# Patient Record
Sex: FEMALE | Race: WHITE | NOT HISPANIC OR LATINO | URBAN - METROPOLITAN AREA
[De-identification: names, ages, dates, MRNs, and addresses within clinical notes are randomized per-mention and may not be internally consistent; named-entity substitution may affect disease eponyms.]

---

## 2022-08-04 ENCOUNTER — OFFICE (OUTPATIENT)
Dept: URBAN - METROPOLITAN AREA CLINIC 29 | Facility: CLINIC | Age: 67
Setting detail: OPHTHALMOLOGY
End: 2022-08-04
Payer: MEDICARE

## 2022-08-04 DIAGNOSIS — H01.005: ICD-10-CM

## 2022-08-04 DIAGNOSIS — H01.002: ICD-10-CM

## 2022-08-04 DIAGNOSIS — H43.811: ICD-10-CM

## 2022-08-04 DIAGNOSIS — H25.12: ICD-10-CM

## 2022-08-04 DIAGNOSIS — H01.004: ICD-10-CM

## 2022-08-04 DIAGNOSIS — H25.13: ICD-10-CM

## 2022-08-04 DIAGNOSIS — H25.11: ICD-10-CM

## 2022-08-04 DIAGNOSIS — H01.001: ICD-10-CM

## 2022-08-04 PROCEDURE — 99204 OFFICE O/P NEW MOD 45 MIN: CPT | Performed by: OPHTHALMOLOGY

## 2022-08-04 ASSESSMENT — REFRACTION_AUTOREFRACTION
OS_SPHERE: -1.00
OD_CYLINDER: +0.25
OS_CYLINDER: +1.75
OS_AXIS: 155
OD_AXIS: 085
OD_SPHERE: +1.00

## 2022-08-04 ASSESSMENT — LID EXAM ASSESSMENTS
OD_BLEPHARITIS: RLL RUL 1+
OS_BLEPHARITIS: LLL LUL 1+

## 2022-08-04 ASSESSMENT — VISUAL ACUITY
OS_BCVA: 20/40
OD_BCVA: 20/30-1

## 2022-08-04 ASSESSMENT — SPHEQUIV_DERIVED
OS_SPHEQUIV: -0.125
OD_SPHEQUIV: 1.125

## 2022-08-04 ASSESSMENT — CONFRONTATIONAL VISUAL FIELD TEST (CVF)
OD_FINDINGS: FULL
OS_FINDINGS: FULL

## 2022-09-07 ENCOUNTER — OFFICE (OUTPATIENT)
Dept: URBAN - METROPOLITAN AREA CLINIC 29 | Facility: CLINIC | Age: 67
Setting detail: OPHTHALMOLOGY
End: 2022-09-07
Payer: MEDICARE

## 2022-09-07 DIAGNOSIS — H01.002: ICD-10-CM

## 2022-09-07 DIAGNOSIS — H25.11: ICD-10-CM

## 2022-09-07 DIAGNOSIS — H25.12: ICD-10-CM

## 2022-09-07 DIAGNOSIS — H01.001: ICD-10-CM

## 2022-09-07 DIAGNOSIS — H25.13: ICD-10-CM

## 2022-09-07 DIAGNOSIS — H01.004: ICD-10-CM

## 2022-09-07 DIAGNOSIS — H52.213: ICD-10-CM

## 2022-09-07 DIAGNOSIS — H01.005: ICD-10-CM

## 2022-09-07 DIAGNOSIS — H43.811: ICD-10-CM

## 2022-09-07 PROCEDURE — 92025 CPTRIZED CORNEAL TOPOGRAPHY: CPT | Performed by: OPHTHALMOLOGY

## 2022-09-07 PROCEDURE — 99214 OFFICE O/P EST MOD 30 MIN: CPT | Performed by: OPHTHALMOLOGY

## 2022-09-07 PROCEDURE — 92136 OPHTHALMIC BIOMETRY: CPT | Performed by: OPHTHALMOLOGY

## 2022-09-07 ASSESSMENT — VISUAL ACUITY
OD_BCVA: 20/30-1
OS_BCVA: 20/40

## 2022-09-07 ASSESSMENT — SPHEQUIV_DERIVED
OD_SPHEQUIV: 1.125
OS_SPHEQUIV: -0.125

## 2022-09-07 ASSESSMENT — REFRACTION_AUTOREFRACTION
OS_AXIS: 155
OS_SPHERE: -1.00
OD_AXIS: 085
OD_SPHERE: +1.00
OD_CYLINDER: +0.25
OS_CYLINDER: +1.75

## 2022-09-07 ASSESSMENT — LID EXAM ASSESSMENTS
OS_BLEPHARITIS: LLL LUL 1+
OD_BLEPHARITIS: RLL RUL 1+

## 2022-09-07 ASSESSMENT — CONFRONTATIONAL VISUAL FIELD TEST (CVF)
OS_FINDINGS: FULL
OD_FINDINGS: FULL

## 2022-12-14 ENCOUNTER — AMBULATORY SURGERY CENTER (OUTPATIENT)
Dept: URBAN - METROPOLITAN AREA SURGERY 17 | Facility: SURGERY | Age: 67
Setting detail: OPHTHALMOLOGY
End: 2022-12-14
Payer: MEDICARE

## 2022-12-14 DIAGNOSIS — H25.11: ICD-10-CM

## 2022-12-14 DIAGNOSIS — H52.211: ICD-10-CM

## 2022-12-14 PROCEDURE — 66984 XCAPSL CTRC RMVL W/O ECP: CPT | Performed by: OPHTHALMOLOGY

## 2022-12-15 ENCOUNTER — OFFICE (OUTPATIENT)
Dept: URBAN - METROPOLITAN AREA CLINIC 29 | Facility: CLINIC | Age: 67
Setting detail: OPHTHALMOLOGY
End: 2022-12-15
Payer: MEDICARE

## 2022-12-15 ENCOUNTER — RX ONLY (RX ONLY)
Age: 67
End: 2022-12-15

## 2022-12-15 DIAGNOSIS — H01.002: ICD-10-CM

## 2022-12-15 DIAGNOSIS — Z96.1: ICD-10-CM

## 2022-12-15 DIAGNOSIS — H25.12: ICD-10-CM

## 2022-12-15 DIAGNOSIS — H01.004: ICD-10-CM

## 2022-12-15 DIAGNOSIS — H52.213: ICD-10-CM

## 2022-12-15 DIAGNOSIS — H01.005: ICD-10-CM

## 2022-12-15 DIAGNOSIS — H01.001: ICD-10-CM

## 2022-12-15 PROCEDURE — 99024 POSTOP FOLLOW-UP VISIT: CPT | Performed by: OPHTHALMOLOGY

## 2022-12-15 ASSESSMENT — LID EXAM ASSESSMENTS
OS_BLEPHARITIS: LLL LUL 1+
OD_BLEPHARITIS: RLL RUL 1+

## 2022-12-15 ASSESSMENT — REFRACTION_AUTOREFRACTION
OS_CYLINDER: +1.75
OS_SPHERE: -1.00
OD_AXIS: 085
OS_AXIS: 155
OD_SPHERE: +1.00
OD_CYLINDER: +0.25

## 2022-12-15 ASSESSMENT — CONFRONTATIONAL VISUAL FIELD TEST (CVF)
OD_FINDINGS: FULL
OS_FINDINGS: FULL

## 2022-12-15 ASSESSMENT — SPHEQUIV_DERIVED
OS_SPHEQUIV: -0.125
OD_SPHEQUIV: 1.125

## 2022-12-15 ASSESSMENT — VISUAL ACUITY
OD_BCVA: 20/40
OS_BCVA: 20/25-2

## 2023-01-16 ENCOUNTER — OFFICE (OUTPATIENT)
Dept: URBAN - METROPOLITAN AREA CLINIC 29 | Facility: CLINIC | Age: 68
Setting detail: OPHTHALMOLOGY
End: 2023-01-16
Payer: MEDICARE

## 2023-01-16 DIAGNOSIS — H01.001: ICD-10-CM

## 2023-01-16 DIAGNOSIS — H01.002: ICD-10-CM

## 2023-01-16 DIAGNOSIS — H52.213: ICD-10-CM

## 2023-01-16 DIAGNOSIS — H01.005: ICD-10-CM

## 2023-01-16 DIAGNOSIS — H25.12: ICD-10-CM

## 2023-01-16 DIAGNOSIS — Z96.1: ICD-10-CM

## 2023-01-16 DIAGNOSIS — H16.221: ICD-10-CM

## 2023-01-16 DIAGNOSIS — H01.004: ICD-10-CM

## 2023-01-16 PROCEDURE — 99024 POSTOP FOLLOW-UP VISIT: CPT | Performed by: OPHTHALMOLOGY

## 2023-01-16 ASSESSMENT — REFRACTION_AUTOREFRACTION
OD_SPHERE: +-0.50
OS_CYLINDER: +1.25
OS_AXIS: 165
OD_CYLINDER: SPHERE
OS_SPHERE: PLANO
OD_AXIS: 000

## 2023-01-16 ASSESSMENT — CONFRONTATIONAL VISUAL FIELD TEST (CVF)
OS_FINDINGS: FULL
OD_FINDINGS: FULL

## 2023-01-16 ASSESSMENT — SUPERFICIAL PUNCTATE KERATITIS (SPK): OD_SPK: T

## 2023-01-16 ASSESSMENT — LID EXAM ASSESSMENTS
OS_BLEPHARITIS: LLL LUL 1+
OD_BLEPHARITIS: RLL RUL 1+

## 2023-01-16 ASSESSMENT — TONOMETRY: OD_IOP_MMHG: 10

## 2023-01-16 ASSESSMENT — REFRACTION_MANIFEST
OD_SPHERE: -0.50
OD_VA1: 20/20

## 2023-01-16 ASSESSMENT — VISUAL ACUITY
OS_BCVA: 20/30
OD_BCVA: 20/40-2

## 2023-02-08 ENCOUNTER — AMBULATORY SURGERY CENTER (OUTPATIENT)
Dept: URBAN - METROPOLITAN AREA SURGERY 17 | Facility: SURGERY | Age: 68
Setting detail: OPHTHALMOLOGY
End: 2023-02-08
Payer: MEDICARE

## 2023-02-08 DIAGNOSIS — H25.12: ICD-10-CM

## 2023-02-08 PROCEDURE — 66984 XCAPSL CTRC RMVL W/O ECP: CPT | Performed by: OPHTHALMOLOGY

## 2023-02-09 ENCOUNTER — OFFICE (OUTPATIENT)
Dept: URBAN - METROPOLITAN AREA CLINIC 29 | Facility: CLINIC | Age: 68
Setting detail: OPHTHALMOLOGY
End: 2023-02-09
Payer: MEDICARE

## 2023-02-09 DIAGNOSIS — Z96.1: ICD-10-CM

## 2023-02-09 DIAGNOSIS — H52.212: ICD-10-CM

## 2023-02-09 DIAGNOSIS — H16.221: ICD-10-CM

## 2023-02-09 DIAGNOSIS — H01.001: ICD-10-CM

## 2023-02-09 DIAGNOSIS — H01.002: ICD-10-CM

## 2023-02-09 DIAGNOSIS — H01.005: ICD-10-CM

## 2023-02-09 DIAGNOSIS — H01.004: ICD-10-CM

## 2023-02-09 PROCEDURE — 99024 POSTOP FOLLOW-UP VISIT: CPT | Performed by: OPHTHALMOLOGY

## 2023-02-09 ASSESSMENT — CONFRONTATIONAL VISUAL FIELD TEST (CVF)
OS_FINDINGS: FULL
OD_FINDINGS: FULL

## 2023-02-09 ASSESSMENT — SUPERFICIAL PUNCTATE KERATITIS (SPK): OD_SPK: T

## 2023-02-09 ASSESSMENT — LID EXAM ASSESSMENTS
OD_BLEPHARITIS: RLL RUL 1+
OS_BLEPHARITIS: LLL LUL 1+

## 2023-02-14 ENCOUNTER — INPATIENT (INPATIENT)
Facility: HOSPITAL | Age: 68
LOS: 0 days | Discharge: HOME | DRG: 117 | End: 2023-02-14
Attending: OPHTHALMOLOGY | Admitting: OPHTHALMOLOGY
Payer: MEDICARE

## 2023-02-14 VITALS
SYSTOLIC BLOOD PRESSURE: 126 MMHG | HEIGHT: 63 IN | WEIGHT: 116.84 LBS | DIASTOLIC BLOOD PRESSURE: 80 MMHG | RESPIRATION RATE: 16 BRPM | OXYGEN SATURATION: 98 % | HEART RATE: 88 BPM | TEMPERATURE: 98 F

## 2023-02-14 VITALS
HEART RATE: 76 BPM | OXYGEN SATURATION: 100 % | SYSTOLIC BLOOD PRESSURE: 114 MMHG | DIASTOLIC BLOOD PRESSURE: 74 MMHG | RESPIRATION RATE: 14 BRPM

## 2023-02-14 DIAGNOSIS — J34.2 DEVIATED NASAL SEPTUM: Chronic | ICD-10-CM

## 2023-02-14 DIAGNOSIS — Z90.89 ACQUIRED ABSENCE OF OTHER ORGANS: Chronic | ICD-10-CM

## 2023-02-14 DIAGNOSIS — H59.022 CATARACT (LENS) FRAGMENTS IN EYE FOLLOWING CATARACT SURGERY, LEFT EYE: ICD-10-CM

## 2023-02-14 DIAGNOSIS — Z98.41 CATARACT EXTRACTION STATUS, RIGHT EYE: Chronic | ICD-10-CM

## 2023-02-14 DIAGNOSIS — Z87.828 PERSONAL HISTORY OF OTHER (HEALED) PHYSICAL INJURY AND TRAUMA: Chronic | ICD-10-CM

## 2023-02-14 LAB
ALBUMIN SERPL ELPH-MCNC: 4.2 G/DL — SIGNIFICANT CHANGE UP (ref 3.3–5)
ALP SERPL-CCNC: 67 U/L — SIGNIFICANT CHANGE UP (ref 30–120)
ALT FLD-CCNC: 23 U/L DA — SIGNIFICANT CHANGE UP (ref 10–60)
ANION GAP SERPL CALC-SCNC: 12 MMOL/L — SIGNIFICANT CHANGE UP (ref 5–17)
APTT BLD: 32.4 SEC — SIGNIFICANT CHANGE UP (ref 27.5–35.5)
AST SERPL-CCNC: 16 U/L — SIGNIFICANT CHANGE UP (ref 10–40)
BASOPHILS # BLD AUTO: 0.04 K/UL — SIGNIFICANT CHANGE UP (ref 0–0.2)
BASOPHILS NFR BLD AUTO: 0.6 % — SIGNIFICANT CHANGE UP (ref 0–2)
BILIRUB SERPL-MCNC: 0.7 MG/DL — SIGNIFICANT CHANGE UP (ref 0.2–1.2)
BUN SERPL-MCNC: 23 MG/DL — SIGNIFICANT CHANGE UP (ref 7–23)
CALCIUM SERPL-MCNC: 8.8 MG/DL — SIGNIFICANT CHANGE UP (ref 8.4–10.5)
CHLORIDE SERPL-SCNC: 111 MMOL/L — HIGH (ref 96–108)
CO2 SERPL-SCNC: 19 MMOL/L — LOW (ref 22–31)
CREAT SERPL-MCNC: 0.8 MG/DL — SIGNIFICANT CHANGE UP (ref 0.5–1.3)
EGFR: 81 ML/MIN/1.73M2 — SIGNIFICANT CHANGE UP
EOSINOPHIL # BLD AUTO: 0.5 K/UL — SIGNIFICANT CHANGE UP (ref 0–0.5)
EOSINOPHIL NFR BLD AUTO: 7.3 % — HIGH (ref 0–6)
GLUCOSE SERPL-MCNC: 103 MG/DL — HIGH (ref 70–99)
HCT VFR BLD CALC: 43.6 % — SIGNIFICANT CHANGE UP (ref 34.5–45)
HGB BLD-MCNC: 14.5 G/DL — SIGNIFICANT CHANGE UP (ref 11.5–15.5)
IMM GRANULOCYTES NFR BLD AUTO: 0.6 % — SIGNIFICANT CHANGE UP (ref 0–0.9)
INR BLD: 1.02 RATIO — SIGNIFICANT CHANGE UP (ref 0.88–1.16)
LYMPHOCYTES # BLD AUTO: 2.48 K/UL — SIGNIFICANT CHANGE UP (ref 1–3.3)
LYMPHOCYTES # BLD AUTO: 36.3 % — SIGNIFICANT CHANGE UP (ref 13–44)
MCHC RBC-ENTMCNC: 31.5 PG — SIGNIFICANT CHANGE UP (ref 27–34)
MCHC RBC-ENTMCNC: 33.3 GM/DL — SIGNIFICANT CHANGE UP (ref 32–36)
MCV RBC AUTO: 94.8 FL — SIGNIFICANT CHANGE UP (ref 80–100)
MONOCYTES # BLD AUTO: 0.66 K/UL — SIGNIFICANT CHANGE UP (ref 0–0.9)
MONOCYTES NFR BLD AUTO: 9.6 % — SIGNIFICANT CHANGE UP (ref 2–14)
NEUTROPHILS # BLD AUTO: 3.12 K/UL — SIGNIFICANT CHANGE UP (ref 1.8–7.4)
NEUTROPHILS NFR BLD AUTO: 45.6 % — SIGNIFICANT CHANGE UP (ref 43–77)
NRBC # BLD: 0 /100 WBCS — SIGNIFICANT CHANGE UP (ref 0–0)
PLATELET # BLD AUTO: 422 K/UL — HIGH (ref 150–400)
POTASSIUM SERPL-MCNC: 3.6 MMOL/L — SIGNIFICANT CHANGE UP (ref 3.5–5.3)
POTASSIUM SERPL-SCNC: 3.6 MMOL/L — SIGNIFICANT CHANGE UP (ref 3.5–5.3)
PROT SERPL-MCNC: 7.2 G/DL — SIGNIFICANT CHANGE UP (ref 6–8.3)
PROTHROM AB SERPL-ACNC: 12 SEC — SIGNIFICANT CHANGE UP (ref 10.5–13.4)
RBC # BLD: 4.6 M/UL — SIGNIFICANT CHANGE UP (ref 3.8–5.2)
RBC # FLD: 12.1 % — SIGNIFICANT CHANGE UP (ref 10.3–14.5)
SARS-COV-2 RNA SPEC QL NAA+PROBE: SIGNIFICANT CHANGE UP
SODIUM SERPL-SCNC: 142 MMOL/L — SIGNIFICANT CHANGE UP (ref 135–145)
WBC # BLD: 6.84 K/UL — SIGNIFICANT CHANGE UP (ref 3.8–10.5)
WBC # FLD AUTO: 6.84 K/UL — SIGNIFICANT CHANGE UP (ref 3.8–10.5)

## 2023-02-14 PROCEDURE — 93010 ELECTROCARDIOGRAM REPORT: CPT

## 2023-02-14 PROCEDURE — 85730 THROMBOPLASTIN TIME PARTIAL: CPT

## 2023-02-14 PROCEDURE — 87635 SARS-COV-2 COVID-19 AMP PRB: CPT

## 2023-02-14 PROCEDURE — 71045 X-RAY EXAM CHEST 1 VIEW: CPT | Mod: 26

## 2023-02-14 PROCEDURE — 85025 COMPLETE CBC W/AUTO DIFF WBC: CPT

## 2023-02-14 PROCEDURE — 36415 COLL VENOUS BLD VENIPUNCTURE: CPT

## 2023-02-14 PROCEDURE — 71045 X-RAY EXAM CHEST 1 VIEW: CPT

## 2023-02-14 PROCEDURE — 85610 PROTHROMBIN TIME: CPT

## 2023-02-14 PROCEDURE — 99222 1ST HOSP IP/OBS MODERATE 55: CPT

## 2023-02-14 PROCEDURE — 80053 COMPREHEN METABOLIC PANEL: CPT

## 2023-02-14 PROCEDURE — 66852 REMOVAL OF LENS MATERIAL: CPT | Mod: AS

## 2023-02-14 PROCEDURE — 99285 EMERGENCY DEPT VISIT HI MDM: CPT

## 2023-02-14 PROCEDURE — 93005 ELECTROCARDIOGRAM TRACING: CPT

## 2023-02-14 RX ORDER — THYROID 120 MG
1 TABLET ORAL
Qty: 0 | Refills: 0 | DISCHARGE

## 2023-02-14 NOTE — ASU PATIENT PROFILE, ADULT - PATIENT REPRESENTATIVE: ( YOU CAN CHOOSE ANY PERSON THAT CAN ASSIST YOU WITH YOUR HEALTH CARE PREFERENCES, DOES NOT HAVE TO BE A SPOUSE, IMMEDIATE FAMILY OR SIGNIFICANT OTHER/PARTNER)
Detail Level: Detailed Patient Specific Counseling (Will Not Stick From Patient To Patient): -samples of moisturizers given Detail Level: Zone Yes

## 2023-02-14 NOTE — ED PROVIDER NOTE - IV ALTEPLASE INCLUSION HIDDEN
Alert and oriented to person, place, time/situation. normal mood and affect. no apparent risk to self or others. show

## 2023-02-14 NOTE — ED PROVIDER NOTE - OBJECTIVE STATEMENT
67-year-old female with history of cataracts had cataract surgery last week with Dr. Kaur but had episode of hypoxia and surgery had to be canceled.  Presents today for preop clearance to have further cataract surgery with Dr. Kaur today.  Has been n.p.o. since yesterday.  Denies any history of lung disease or smoking.  Feels otherwise well.

## 2023-02-14 NOTE — CONSULT NOTE ADULT - SUBJECTIVE AND OBJECTIVE BOX
This is an H and P    HPI:    67-year-old female with history of cataracts had cataract surgery last week with Dr. Kaur but had episode of hypoxia and surgery had to be canceled.  Presents today for preop clearance to have further cataract surgery with Dr. Kaur today.  Has been n.p.o. since yesterday.  Denies any history of lung disease or smoking.  Feels otherwise well.    REVIEW OF SYSTEMS:  CONSTITUTIONAL: No fever, weight loss, or fatigue  RESPIRATORY: No cough, wheezing, chills or hemoptysis; No shortness of breath  CARDIOVASCULAR: No chest pain, palpitations, dizziness, or leg swelling  GASTROINTESTINAL: No abdominal or epigastric pain. No nausea, vomiting, or hematemesis; No diarrhea or constipation. No melena or hematochezia.  GENITOURINARY: No dysuria, frequency, hematuria, or incontinence  NEUROLOGICAL: No headaches, memory loss, loss of strength, numbness, or tremors  MUSCULOSKELETAL: No muscle or back pain  PSYCHIATRIC: No depression, anxiety, mood swings, or difficulty sleeping      PAST MEDICAL & SURGICAL HISTORY:      SOCIAL HISTORY:  Deniest Tobacco  Denies Etoh  Denies Drugs      Allergies    erythromycin (Nausea)    Intolerances        MEDICATIONS  (STANDING):    MEDICATIONS  (PRN):      FAMILY HISTORY:      Vital Signs Last 24 Hrs  T(C): 36.7 (14 Feb 2023 07:20), Max: 36.7 (14 Feb 2023 07:20)  T(F): 98 (14 Feb 2023 07:20), Max: 98 (14 Feb 2023 07:20)  HR: 88 (14 Feb 2023 07:20) (88 - 88)  BP: 126/80 (14 Feb 2023 07:20) (126/80 - 126/80)  BP(mean): --  RR: 16 (14 Feb 2023 07:20) (16 - 16)  SpO2: 98% (14 Feb 2023 07:20) (98% - 98%)    Parameters below as of 14 Feb 2023 07:20  Patient On (Oxygen Delivery Method): room air        PHYSICAL EXAM:    GENERAL: NAD, well-groomed, well-developed  HEAD:  Atraumatic, Normocephalic  EYES: EOMI, PERRLA, conjunctiva and sclera clear  ENMT: No tonsillar erythema, exudates, or enlargement; Moist mucous membranes, Good dentition, No lesions  NECK: Supple, No JVD, Normal thyroid  NERVOUS SYSTEM:  Alert & Oriented X3, Good concentration; Moving all 4 extremities; No gross sensory deficits  CHEST/LUNG: Clear to auscultation bilaterally; No rales, rhonchi, wheezing, or rubs  HEART: Regular rate and rhythm; No murmurs, rubs, or gallops  ABDOMEN: Soft, Nontender, Nondistended; Bowel sounds present  EXTREMITIES:  2+ Peripheral Pulses, No clubbing, cyanosis, or edema                          14.5   6.84  )-----------( 422      ( 14 Feb 2023 08:02 )             43.6     02-14    142  |  111<H>  |  23  ----------------------------<  103<H>  3.6   |  19<L>  |  0.80    Ca    8.8      14 Feb 2023 08:02    TPro  7.2  /  Alb  4.2  /  TBili  0.7  /  DBili  x   /  AST  16  /  ALT  23  /  AlkPhos  67  02-14                   
Date/Time Patient Seen:  		  Referring MD:   Data Reviewed	       Patient is a 67y old  Female who presents with a chief complaint of left eye cataract (14 Feb 2023 09:13)      Subjective/HPI   · Chief Complaint: The patient is a 67y Female complaining of eye vision change.  · HPI Objective Statement: 67-year-old female with history of cataracts had cataract surgery last week with Dr. Kaur but had episode of hypoxia and surgery had to be canceled.  Presents today for preop clearance to have further cataract surgery with Dr. Kaur today.  Has been n.p.o. since yesterday.  Denies any history of lung disease or smoking.  Feels otherwise well.  · Presenting Symptoms: BLURRED VISION  · Negative Findings: no discharge, no double vision, no drainage, no eye lid swelling, no foreign body, no itching, no pain, no photophobia, no purulent drainage  · Location: eye  · Laterality: left  · Timing: unknown  · Duration: day(s)  · Severity: PAIN SCALE 0 OF 10.  · Context: known (describe)    PAST MEDICAL & SURGICAL HISTORY:  No pertinent past medical history    No pertinent past medical history    Hypothyroidism    Major depression    H/O depression headache    H/O: depression    No significant past surgical history    History of torn meniscus of left knee  Surgical intervention    Deviated septum  Surgical repair    History of tonsillectomy    H/O bilateral cataract extraction    HISTORY OF PRESENT ILLNESS:    International Travel:  International Travel within 21 days? No.(1)     Preferred Language to Address Healthcare:  · Preferred Language to Address Healthcare	English     Child Abuse Assessment (patients less than 13 yrs):  Chief Complaint: eye vision change.    · Chief Complaint: The patient is a 67y Female complaining of eye vision change.  · HPI Objective Statement: 67-year-old female with history of cataracts had cataract surgery last week with Dr. Kaur but had episode of hypoxia and surgery had to be canceled.  Presents today for preop clearance to have further cataract surgery with Dr. Kaur today.  Has been n.p.o. since yesterday.  Denies any history of lung disease or smoking.  Feels otherwise well.  · Presenting Symptoms: BLURRED VISION  · Negative Findings: no discharge, no double vision, no drainage, no eye lid swelling, no foreign body, no itching, no pain, no photophobia, no purulent drainage  · Location: eye  · Laterality: left  · Timing: unknown  · Duration: day(s)  · Severity: PAIN SCALE 0 OF 10.  · Context: known (describe)    HIV:    HIV Test Questions:  · In accordance with [place] State law, we offer every patient who comes to our ED an HIV test. Would you like to be tested today?	Opt out    PAST MEDICAL/SURGICAL/FAMILY/SOCIAL HISTORY:    Tobacco Usage:  · Tobacco Usage	Never smoker    ALLERGIES AND HOME MEDICATIONS:   Allergies:        Allergies:  	erythromycin: Drug, Nausea    Home Medications:   * Outpatient Medication Status not yet specified  REVIEW OF SYSTEMS:    Review of Systems:  · CONSTITUTIONAL: no fever and no chills.  · EYES: - - -  · Eyes [+]: VISUAL CHANGES  · Eyes [-]: no discharge, no diplopia, no redness  · ENMT: Ears: no ear pain and no hearing problems. Nose: no nasal congestion and no nasal drainage. Mouth/Throat: no dysphagia, no hoarseness and no throat pain. Neck: no lumps, no pain, no stiffness and no swollen glands.  · CARDIOVASCULAR: no chest pain and no edema.  · RESPIRATORY: no chest pain, no cough, and no shortness of breath.  · GASTROINTESTINAL: no abdominal pain, no bloating, no constipation, no diarrhea, no nausea and no vomiting.  · GENITOURINARY: no dysuria, no frequency, and no hematuria.  · MUSCULOSKELETAL: no back pain, no gout, no musculoskeletal pain, no neck pain, and no weakness.  · SKIN: no abrasions, no jaundice, no lesions, no pruritis, and no rashes.  · NEURO: no loss of consciousness, no gait abnormality, no headache, no sensory deficits, and no weakness.        Medication list         MEDICATIONS  (STANDING):    MEDICATIONS  (PRN):         Vitals log        ICU Vital Signs Last 24 Hrs  T(C): 36.7 (14 Feb 2023 10:21), Max: 36.7 (14 Feb 2023 07:20)  T(F): 98.1 (14 Feb 2023 10:21), Max: 98.1 (14 Feb 2023 10:21)  HR: 82 (14 Feb 2023 10:21) (82 - 88)  BP: 110/76 (14 Feb 2023 10:21) (110/76 - 126/80)  BP(mean): --  ABP: --  ABP(mean): --  RR: 13 (14 Feb 2023 10:21) (13 - 16)  SpO2: 100% (14 Feb 2023 10:21) (98% - 100%)    O2 Parameters below as of 14 Feb 2023 07:20  Patient On (Oxygen Delivery Method): room air                 Input and Output:  I&O's Detail      Lab Data                        14.5   6.84  )-----------( 422      ( 14 Feb 2023 08:02 )             43.6     02-14    142  |  111<H>  |  23  ----------------------------<  103<H>  3.6   |  19<L>  |  0.80    Ca    8.8      14 Feb 2023 08:02    TPro  7.2  /  Alb  4.2  /  TBili  0.7  /  DBili  x   /  AST  16  /  ALT  23  /  AlkPhos  67  02-14            Review of Systems	  giancarlo op    Objective     Physical Examination    heart s1s2  lung dec BS  head nc  head at      Pertinent Lab findings & Imaging      Whitaker:  NO   Adequate UO     I&O's Detail           Discussed with:     Cultures:	        Radiology    ACC: [id number] EXAM:  XR CHEST PORTABLE URGENT 1V   ORDERED BY: CHIP TYLER     PROCEDURE DATE:  02/14/2023          INTERPRETATION:  INDICATION: Left eye injury. Preop    AP chest    COMPARISON: None    FINDINGS:  Heart/Vascular: The heart size, mediastinum, hilum and aorta are within   normal limits for projection.  Pulmonary: Midline trachea. There is no focal infiltrate, congestion or   effusion.    Bones: There is no fracture.  Lines and catheter: None    Impression:    No acute pulmonary disease.    --- End of Report ---             SAIMA MOTA DO; Attending Radiologist  This document has been electronically signed. Feb 14 2023 10:15AM

## 2023-02-14 NOTE — ASU PATIENT PROFILE, ADULT - FALL HARM RISK - HARM RISK INTERVENTIONS

## 2023-02-14 NOTE — ED PROVIDER NOTE - CLINICAL SUMMARY MEDICAL DECISION MAKING FREE TEXT BOX
Retained cataract fragments in left eye after surgery.  4 OR today.  Plan preop clearance and admit.

## 2023-02-14 NOTE — ASU DISCHARGE PLAN (ADULT/PEDIATRIC) - NS MD DC FALL RISK RISK
For information on Fall & Injury Prevention, visit: [email or web address] OR  [email or web address] OR  https://www.cdc.gov/steadi/patient.html

## 2023-02-14 NOTE — CONSULT NOTE ADULT - ASSESSMENT
67-year-old female with history of cataracts had cataract surgery last week with Dr. Kaur but had episode of hypoxia and surgery had to be canceled.    cataract sx  thyroid disease  ex smoker    episode of hypoxemia in the past - not specified  cxr clear  no wheeze  no resp distress  no evidence of resp compromise  no objection for OR  PFT and Pulm Eval as outpatient  no exposures in the community  no sick contacts reported  discussed with Anesthesia MD and Ophthalmology MD  
67-year-old female with no pmh is here for removal of cataract fragments of left eye    #removal of cataract fragments of left eye  Preop and post op orders per eye team  Pt had a hypoxic episode last procedure, but has no hx of lung disease.   Dr. Martinez from pulmonary was consulted for clearance  Medically optimized for procedure pending pulmonary clearance

## 2023-02-14 NOTE — ASU PATIENT PROFILE, ADULT - NSICDXPASTSURGICALHX_GEN_ALL_CORE_FT
PAST SURGICAL HISTORY:  Deviated septum Surgical repair    History of torn meniscus of left knee Surgical intervention     PAST SURGICAL HISTORY:  Deviated septum Surgical repair    H/O bilateral cataract extraction     History of tonsillectomy     History of torn meniscus of left knee Surgical intervention

## 2023-02-14 NOTE — ED PROVIDER NOTE - CHPI ED SYMPTOMS NEG
no discharge/no drainage/no itching/no pain/no photophobia/no purulent drainage/no double vision/no eye lid swelling/no foreign body

## 2023-02-14 NOTE — ED ADULT TRIAGE NOTE - CHIEF COMPLAINT QUOTE
" Im here  for LEFT eye procedure " Pt sent in for left eye -  post cataract - clean out fragments from the previous surgery

## 2023-02-14 NOTE — ED ADULT NURSE NOTE - OBJECTIVE STATEMENT
68 y/o female received aox4 ambulatory for eye surgery. pt reports that she had cataract surgery a week ago and some fragments were left in left eye causing her to see floating particles. denies pain. IVL inserted, bloods drawn and sent to lab.

## 2023-02-14 NOTE — ED PROVIDER NOTE - EYES, MLM
Addended by: NAYELI GAMBINO on: 8/5/2021 08:37 AM     Modules accepted: Orders    
Left pupil dilated and minimally reactive right pupil round and reactive.

## 2023-02-14 NOTE — ED ADULT TRIAGE NOTE - ARRIVAL FROM
Called pt and advised of Dr. William's note.  Pt verbalized understanding.     Pt states she is still having at least 4 watery stools per day, fecal urgency and dehydration.  States she is having the same symptoms she had when she collagenous colitis.  States it was resolved last time with budesonide. If pt needs an rx, please send to [facility] in [place].     Msg sent to Dr William.    Home

## 2023-02-17 ASSESSMENT — REFRACTION_AUTOREFRACTION
OS_CYLINDER: +1.25
OD_SPHERE: +-0.50
OS_AXIS: 165
OD_AXIS: 000
OD_CYLINDER: SPHERE
OS_SPHERE: PLANO

## 2023-02-17 ASSESSMENT — REFRACTION_MANIFEST
OD_VA1: 20/20
OD_SPHERE: -0.50

## 2023-02-17 ASSESSMENT — VISUAL ACUITY
OS_BCVA: 20/40+1
OD_BCVA: 20/100-1

## 2023-02-28 NOTE — ED ADULT TRIAGE NOTE - AS HEIGHT TYPE
stated
Robby Rn: pt received from intake, A&04  respirations even and unlabored, appears in NAD. vitals as per flow sheet. started on insulin gtt @10ml/hr. 18GIV placed to LAC.

## 2023-03-07 ENCOUNTER — OFFICE (OUTPATIENT)
Dept: URBAN - METROPOLITAN AREA CLINIC 29 | Facility: CLINIC | Age: 68
Setting detail: OPHTHALMOLOGY
End: 2023-03-07
Payer: MEDICARE

## 2023-03-07 ENCOUNTER — RX ONLY (RX ONLY)
Age: 68
End: 2023-03-07

## 2023-03-07 DIAGNOSIS — Z96.1: ICD-10-CM

## 2023-03-07 DIAGNOSIS — H16.221: ICD-10-CM

## 2023-03-07 DIAGNOSIS — H01.004: ICD-10-CM

## 2023-03-07 DIAGNOSIS — H01.002: ICD-10-CM

## 2023-03-07 DIAGNOSIS — H52.212: ICD-10-CM

## 2023-03-07 DIAGNOSIS — H01.005: ICD-10-CM

## 2023-03-07 DIAGNOSIS — H01.001: ICD-10-CM

## 2023-03-07 PROCEDURE — 99024 POSTOP FOLLOW-UP VISIT: CPT | Performed by: OPHTHALMOLOGY

## 2023-03-07 ASSESSMENT — REFRACTION_MANIFEST
OD_SPHERE: PL
OD_SPHERE: -0.50
OS_AXIS: 165
OD_CYLINDER: SPH
OS_VA1: 20/30
OD_VA1: 20/30
OS_SPHERE: -2.25
OD_VA1: 20/20
OS_CYLINDER: +2.25

## 2023-03-07 ASSESSMENT — VISUAL ACUITY: OD_BCVA: 20/DEFERRED

## 2023-03-07 ASSESSMENT — CONFRONTATIONAL VISUAL FIELD TEST (CVF)
OD_FINDINGS: FULL
OS_FINDINGS: FULL

## 2023-03-07 ASSESSMENT — TONOMETRY: OS_IOP_MMHG: 18

## 2023-03-07 ASSESSMENT — REFRACTION_AUTOREFRACTION
OS_SPHERE: -1.75
OD_AXIS: 005
OD_SPHERE: -1.00
OS_CYLINDER: +1.75
OS_AXIS: 165
OD_CYLINDER: +0.50

## 2023-03-07 ASSESSMENT — LID EXAM ASSESSMENTS
OD_BLEPHARITIS: RLL RUL 1+
OS_BLEPHARITIS: LLL LUL 1+

## 2023-03-07 ASSESSMENT — SPHEQUIV_DERIVED
OS_SPHEQUIV: -0.875
OS_SPHEQUIV: -1.125
OD_SPHEQUIV: -0.75

## 2023-03-07 ASSESSMENT — SUPERFICIAL PUNCTATE KERATITIS (SPK): OD_SPK: T

## 2023-03-17 ENCOUNTER — OFFICE (OUTPATIENT)
Dept: URBAN - METROPOLITAN AREA CLINIC 29 | Facility: CLINIC | Age: 68
Setting detail: OPHTHALMOLOGY
End: 2023-03-17
Payer: MEDICARE

## 2023-03-17 DIAGNOSIS — H01.004: ICD-10-CM

## 2023-03-17 DIAGNOSIS — H01.001: ICD-10-CM

## 2023-03-17 DIAGNOSIS — Z96.1: ICD-10-CM

## 2023-03-17 DIAGNOSIS — H01.002: ICD-10-CM

## 2023-03-17 DIAGNOSIS — H01.005: ICD-10-CM

## 2023-03-17 DIAGNOSIS — H52.212: ICD-10-CM

## 2023-03-17 DIAGNOSIS — H16.221: ICD-10-CM

## 2023-03-17 PROCEDURE — 99024 POSTOP FOLLOW-UP VISIT: CPT | Performed by: OPHTHALMOLOGY

## 2023-03-17 ASSESSMENT — SPHEQUIV_DERIVED
OS_SPHEQUIV: -0.875
OS_SPHEQUIV: -1.125
OD_SPHEQUIV: -0.75

## 2023-03-17 ASSESSMENT — REFRACTION_MANIFEST
OS_CYLINDER: +2.25
OS_AXIS: 165
OS_VA1: 20/30
OD_SPHERE: PL
OS_SPHERE: -2.25
OD_VA1: 20/30
OD_SPHERE: -0.50
OD_CYLINDER: SPH
OD_VA1: 20/20

## 2023-03-17 ASSESSMENT — REFRACTION_AUTOREFRACTION
OD_SPHERE: -1.00
OS_SPHERE: -1.75
OD_CYLINDER: +0.50
OS_CYLINDER: +1.75
OS_AXIS: 165
OD_AXIS: 005

## 2023-03-17 ASSESSMENT — VISUAL ACUITY
OD_BCVA: 20/70-2
OS_BCVA: 20/25-2

## 2023-03-17 ASSESSMENT — LID EXAM ASSESSMENTS
OS_BLEPHARITIS: LLL LUL 1+
OD_BLEPHARITIS: RLL RUL 1+

## 2023-03-17 ASSESSMENT — TONOMETRY: OS_IOP_MMHG: 16

## 2023-03-17 ASSESSMENT — CONFRONTATIONAL VISUAL FIELD TEST (CVF)
OD_FINDINGS: FULL
OS_FINDINGS: FULL

## 2023-03-17 ASSESSMENT — SUPERFICIAL PUNCTATE KERATITIS (SPK): OD_SPK: T

## 2023-04-04 ENCOUNTER — OFFICE (OUTPATIENT)
Dept: URBAN - METROPOLITAN AREA CLINIC 29 | Facility: CLINIC | Age: 68
Setting detail: OPHTHALMOLOGY
End: 2023-04-04
Payer: MEDICARE

## 2023-04-04 DIAGNOSIS — H01.004: ICD-10-CM

## 2023-04-04 DIAGNOSIS — H01.005: ICD-10-CM

## 2023-04-04 DIAGNOSIS — H52.212: ICD-10-CM

## 2023-04-04 DIAGNOSIS — H16.221: ICD-10-CM

## 2023-04-04 DIAGNOSIS — H52.13: ICD-10-CM

## 2023-04-04 DIAGNOSIS — Z96.1: ICD-10-CM

## 2023-04-04 DIAGNOSIS — H01.001: ICD-10-CM

## 2023-04-04 DIAGNOSIS — H01.002: ICD-10-CM

## 2023-04-04 PROCEDURE — 92025 CPTRIZED CORNEAL TOPOGRAPHY: CPT | Performed by: OPHTHALMOLOGY

## 2023-04-04 PROCEDURE — 92015 DETERMINE REFRACTIVE STATE: CPT | Performed by: OPHTHALMOLOGY

## 2023-04-04 PROCEDURE — 99024 POSTOP FOLLOW-UP VISIT: CPT | Performed by: OPHTHALMOLOGY

## 2023-04-04 ASSESSMENT — CONFRONTATIONAL VISUAL FIELD TEST (CVF)
OD_FINDINGS: FULL
OS_FINDINGS: FULL

## 2023-04-04 ASSESSMENT — LID EXAM ASSESSMENTS
OS_BLEPHARITIS: LLL LUL 1+
OD_BLEPHARITIS: RLL RUL 1+

## 2023-04-04 ASSESSMENT — VISUAL ACUITY
OS_BCVA: 20/30-1
OD_BCVA: 20/70-2

## 2023-04-04 ASSESSMENT — TONOMETRY: OS_IOP_MMHG: 14

## 2023-04-04 ASSESSMENT — SUPERFICIAL PUNCTATE KERATITIS (SPK): OD_SPK: T

## 2023-04-27 ENCOUNTER — OFFICE (OUTPATIENT)
Dept: URBAN - METROPOLITAN AREA CLINIC 29 | Facility: CLINIC | Age: 68
Setting detail: OPHTHALMOLOGY
End: 2023-04-27
Payer: MEDICARE

## 2023-04-27 DIAGNOSIS — H01.005: ICD-10-CM

## 2023-04-27 DIAGNOSIS — H52.212: ICD-10-CM

## 2023-04-27 DIAGNOSIS — H01.004: ICD-10-CM

## 2023-04-27 DIAGNOSIS — Z96.1: ICD-10-CM

## 2023-04-27 DIAGNOSIS — H01.001: ICD-10-CM

## 2023-04-27 DIAGNOSIS — H01.002: ICD-10-CM

## 2023-04-27 DIAGNOSIS — H16.221: ICD-10-CM

## 2023-04-27 PROCEDURE — 99024 POSTOP FOLLOW-UP VISIT: CPT | Performed by: OPHTHALMOLOGY

## 2023-04-27 ASSESSMENT — REFRACTION_MANIFEST
OD_SPHERE: PL
OS_CYLINDER: +1.00
OS_AXIS: 165
OD_SPHERE: PL
OS_SPHERE: -2.25
OS_VA1: 20/25-
OS_AXIS: 165
OS_VA1: 20/30
OS_SPHERE: -2.00
OS_CYLINDER: +1.75
OS_AXIS: 155
OD_SPHERE: -0.50
OS_SPHERE: -2.00
OD_CYLINDER: SPH
OD_CYLINDER: SPH
OS_CYLINDER: +2.25
OS_VA1: 20/25+
OD_VA1: 20/30
OD_VA1: 20/20

## 2023-04-27 ASSESSMENT — SPHEQUIV_DERIVED
OS_SPHEQUIV: -0.875
OS_SPHEQUIV: -1.5
OS_SPHEQUIV: -1.125
OS_SPHEQUIV: -1.125
OD_SPHEQUIV: -0.75

## 2023-04-27 ASSESSMENT — REFRACTION_AUTOREFRACTION
OS_AXIS: 165
OS_SPHERE: -1.75
OS_CYLINDER: +1.75
OD_SPHERE: -1.00
OD_CYLINDER: +0.50
OD_AXIS: 005

## 2023-04-27 ASSESSMENT — LID EXAM ASSESSMENTS
OD_BLEPHARITIS: RLL RUL 1+
OS_BLEPHARITIS: LLL LUL 1+

## 2023-04-27 ASSESSMENT — CONFRONTATIONAL VISUAL FIELD TEST (CVF)
OS_FINDINGS: FULL
OD_FINDINGS: FULL

## 2023-04-27 ASSESSMENT — SUPERFICIAL PUNCTATE KERATITIS (SPK): OD_SPK: T

## 2023-04-27 ASSESSMENT — VISUAL ACUITY
OS_BCVA: 20/30-1
OD_BCVA: 20/80-2

## 2023-05-15 ENCOUNTER — OFFICE (OUTPATIENT)
Dept: URBAN - METROPOLITAN AREA CLINIC 29 | Facility: CLINIC | Age: 68
Setting detail: OPHTHALMOLOGY
End: 2023-05-15
Payer: MEDICARE

## 2023-05-15 DIAGNOSIS — H01.005: ICD-10-CM

## 2023-05-15 DIAGNOSIS — H16.221: ICD-10-CM

## 2023-05-15 DIAGNOSIS — H52.212: ICD-10-CM

## 2023-05-15 DIAGNOSIS — Z96.1: ICD-10-CM

## 2023-05-15 DIAGNOSIS — H01.004: ICD-10-CM

## 2023-05-15 DIAGNOSIS — H01.001: ICD-10-CM

## 2023-05-15 DIAGNOSIS — H01.002: ICD-10-CM

## 2023-05-15 PROCEDURE — 99024 POSTOP FOLLOW-UP VISIT: CPT | Performed by: OPHTHALMOLOGY

## 2023-05-15 ASSESSMENT — SPHEQUIV_DERIVED
OD_SPHEQUIV: -0.75
OS_SPHEQUIV: -1.125
OS_SPHEQUIV: -0.875
OS_SPHEQUIV: -1.125
OS_SPHEQUIV: -1.5
OS_SPHEQUIV: -1.375

## 2023-05-15 ASSESSMENT — REFRACTION_MANIFEST
OS_AXIS: 165
OD_VA1: 20/20
OS_CYLINDER: +0.75
OS_CYLINDER: +1.00
OS_AXIS: 155
OD_SPHERE: PL
OS_VA1: 20/25
OD_CYLINDER: SPH
OS_SPHERE: -1.75
OS_SPHERE: -2.25
OS_AXIS: 155
OD_CYLINDER: SPH
OD_SPHERE: PL
OS_SPHERE: -2.00
OS_VA1: 20/25-
OS_VA1: 20/30
OS_AXIS: 165
OD_SPHERE: -0.50
OD_VA1: 20/30
OS_VA1: 20/25+
OS_CYLINDER: +2.25
OS_CYLINDER: +1.75
OS_SPHERE: -2.00
OD_CYLINDER: SPH
OD_SPHERE: PL

## 2023-05-15 ASSESSMENT — VISUAL ACUITY
OS_BCVA: 20/25-1
OD_BCVA: 20/50

## 2023-05-15 ASSESSMENT — SUPERFICIAL PUNCTATE KERATITIS (SPK): OD_SPK: T

## 2023-05-15 ASSESSMENT — REFRACTION_AUTOREFRACTION
OS_CYLINDER: +1.75
OD_AXIS: 005
OD_CYLINDER: +0.50
OS_AXIS: 165
OS_SPHERE: -1.75
OD_SPHERE: -1.00

## 2023-05-15 ASSESSMENT — CONFRONTATIONAL VISUAL FIELD TEST (CVF)
OS_FINDINGS: FULL
OD_FINDINGS: FULL

## 2023-05-15 ASSESSMENT — TONOMETRY
OD_IOP_MMHG: 12
OS_IOP_MMHG: 16

## 2023-05-15 ASSESSMENT — LID EXAM ASSESSMENTS
OD_BLEPHARITIS: RLL RUL 1+
OS_BLEPHARITIS: LLL LUL 1+

## 2023-05-25 ASSESSMENT — REFRACTION_AUTOREFRACTION
OD_SPHERE: -1.00
OS_SPHERE: -1.75
OS_CYLINDER: +1.75
OD_AXIS: 005
OS_AXIS: 165
OD_CYLINDER: +0.50

## 2023-05-25 ASSESSMENT — SPHEQUIV_DERIVED
OS_SPHEQUIV: -1.125
OD_SPHEQUIV: -0.75
OS_SPHEQUIV: -0.875
OS_SPHEQUIV: -1.125

## 2023-05-25 ASSESSMENT — REFRACTION_MANIFEST
OS_AXIS: 155
OD_SPHERE: PL
OS_VA1: 20/30
OS_CYLINDER: +1.75
OD_CYLINDER: SPH
OS_SPHERE: -2.25
OS_AXIS: 165
OD_SPHERE: -0.50
OD_SPHERE: PL
OD_VA1: 20/20
OD_VA1: 20/30
OS_SPHERE: -2.00
OS_CYLINDER: +2.25
OD_CYLINDER: SPH
OS_VA1: 20/25+

## 2023-06-05 ENCOUNTER — OFFICE (OUTPATIENT)
Dept: URBAN - METROPOLITAN AREA CLINIC 29 | Facility: CLINIC | Age: 68
Setting detail: OPHTHALMOLOGY
End: 2023-06-05
Payer: MEDICARE

## 2023-06-05 DIAGNOSIS — H01.002: ICD-10-CM

## 2023-06-05 DIAGNOSIS — H52.212: ICD-10-CM

## 2023-06-05 DIAGNOSIS — H01.001: ICD-10-CM

## 2023-06-05 DIAGNOSIS — Z96.1: ICD-10-CM

## 2023-06-05 DIAGNOSIS — H01.005: ICD-10-CM

## 2023-06-05 DIAGNOSIS — H16.221: ICD-10-CM

## 2023-06-05 DIAGNOSIS — H01.004: ICD-10-CM

## 2023-06-05 PROCEDURE — 99024 POSTOP FOLLOW-UP VISIT: CPT | Performed by: OPHTHALMOLOGY

## 2023-06-05 ASSESSMENT — CONFRONTATIONAL VISUAL FIELD TEST (CVF)
OS_FINDINGS: FULL
OD_FINDINGS: FULL

## 2023-06-05 ASSESSMENT — LID EXAM ASSESSMENTS
OD_BLEPHARITIS: RLL RUL 1+
OS_BLEPHARITIS: LLL LUL 1+

## 2023-06-05 ASSESSMENT — SUPERFICIAL PUNCTATE KERATITIS (SPK): OD_SPK: T

## 2023-06-06 ASSESSMENT — VISUAL ACUITY
OS_BCVA: 20/25-1
OD_BCVA: 20/70-2

## 2023-06-06 ASSESSMENT — REFRACTION_MANIFEST
OD_VA1: 20/20
OS_AXIS: 155
OS_VA1: 20/20-1
OS_VA1: 20/25
OS_AXIS: 165
OS_CYLINDER: +1.25
OS_CYLINDER: +0.75
OS_CYLINDER: +1.75
OS_SPHERE: -1.50
OS_AXIS: 165
OD_SPHERE: -0.50
OD_CYLINDER: SPH
OS_SPHERE: -2.25
OS_SPHERE: -2.00
OS_SPHERE: -1.75
OD_SPHERE: PL
OD_CYLINDER: SPH
OS_CYLINDER: +1.00
OD_VA1: 20/30
OS_AXIS: 155
OS_SPHERE: -2.00
OS_VA1: 20/25-
OD_SPHERE: PL
OD_CYLINDER: SPH
OS_VA1: 20/30
OD_SPHERE: PL
OS_AXIS: 155
OS_VA1: 20/25+
OS_CYLINDER: +2.25

## 2023-06-06 ASSESSMENT — REFRACTION_AUTOREFRACTION
OD_SPHERE: -1.00
OS_AXIS: 165
OS_CYLINDER: +1.75
OS_SPHERE: -1.75
OD_CYLINDER: +0.50
OD_AXIS: 005

## 2023-06-06 ASSESSMENT — SPHEQUIV_DERIVED
OS_SPHEQUIV: -1.125
OS_SPHEQUIV: -0.875
OS_SPHEQUIV: -1.375
OS_SPHEQUIV: -1.5
OS_SPHEQUIV: -0.875
OD_SPHEQUIV: -0.75
OS_SPHEQUIV: -1.125

## 2023-06-20 ENCOUNTER — OFFICE (OUTPATIENT)
Dept: URBAN - METROPOLITAN AREA CLINIC 29 | Facility: CLINIC | Age: 68
Setting detail: OPHTHALMOLOGY
End: 2023-06-20
Payer: MEDICARE

## 2023-06-20 DIAGNOSIS — Z96.1: ICD-10-CM

## 2023-06-20 DIAGNOSIS — H27.112: ICD-10-CM

## 2023-06-20 DIAGNOSIS — H01.004: ICD-10-CM

## 2023-06-20 DIAGNOSIS — H01.001: ICD-10-CM

## 2023-06-20 DIAGNOSIS — H52.212: ICD-10-CM

## 2023-06-20 DIAGNOSIS — H01.005: ICD-10-CM

## 2023-06-20 DIAGNOSIS — H16.221: ICD-10-CM

## 2023-06-20 DIAGNOSIS — H01.002: ICD-10-CM

## 2023-06-20 PROCEDURE — 76513 OPH US DX ANT SGM US UNI/BI: CPT | Performed by: OPHTHALMOLOGY

## 2023-06-20 PROCEDURE — 99024 POSTOP FOLLOW-UP VISIT: CPT | Performed by: OPHTHALMOLOGY

## 2023-06-20 ASSESSMENT — REFRACTION_MANIFEST
OD_SPHERE: -0.50
OS_AXIS: 155
OD_CYLINDER: SPH
OS_CYLINDER: +0.75
OS_VA1: 20/30
OS_VA1: 20/20-1
OS_AXIS: 155
OS_CYLINDER: +1.75
OS_SPHERE: -2.00
OS_SPHERE: -1.50
OS_SPHERE: -1.75
OS_SPHERE: -2.00
OS_CYLINDER: +1.25
OS_AXIS: 155
OS_CYLINDER: +2.25
OS_VA1: 20/25-
OD_VA1: 20/20
OS_AXIS: 165
OS_CYLINDER: +1.00
OD_SPHERE: PL
OS_VA1: 20/25
OD_CYLINDER: SPH
OD_VA1: 20/30
OD_SPHERE: PL
OD_CYLINDER: SPH
OS_SPHERE: -2.25
OD_SPHERE: PL
OS_VA1: 20/25+
OS_AXIS: 165

## 2023-06-20 ASSESSMENT — SPHEQUIV_DERIVED
OS_SPHEQUIV: -1.375
OS_SPHEQUIV: -1.125
OS_SPHEQUIV: -1.5
OS_SPHEQUIV: -1.125
OD_SPHEQUIV: -0.75
OS_SPHEQUIV: -0.875
OS_SPHEQUIV: -0.875

## 2023-06-20 ASSESSMENT — CONFRONTATIONAL VISUAL FIELD TEST (CVF)
OD_FINDINGS: FULL
OS_FINDINGS: FULL

## 2023-06-20 ASSESSMENT — REFRACTION_AUTOREFRACTION
OD_CYLINDER: +0.50
OS_AXIS: 165
OS_SPHERE: -1.75
OD_SPHERE: -1.00
OD_AXIS: 005
OS_CYLINDER: +1.75

## 2023-06-20 ASSESSMENT — SUPERFICIAL PUNCTATE KERATITIS (SPK): OD_SPK: T

## 2023-06-20 ASSESSMENT — LID EXAM ASSESSMENTS
OS_BLEPHARITIS: LLL LUL 1+
OD_BLEPHARITIS: RLL RUL 1+

## 2023-06-20 ASSESSMENT — VISUAL ACUITY
OS_BCVA: 20/30-2
OD_BCVA: 20/70

## 2023-07-26 ENCOUNTER — AMBULATORY SURGERY CENTER (OUTPATIENT)
Dept: URBAN - METROPOLITAN AREA SURGERY 17 | Facility: SURGERY | Age: 68
Setting detail: OPHTHALMOLOGY
End: 2023-07-26
Payer: MEDICARE

## 2023-07-26 DIAGNOSIS — H52.212: ICD-10-CM

## 2023-07-26 DIAGNOSIS — H27.112: ICD-10-CM

## 2023-07-26 PROCEDURE — 66825 REPOSITION INTRAOCULAR LENS: CPT | Performed by: OPHTHALMOLOGY

## 2023-07-27 ENCOUNTER — OFFICE (OUTPATIENT)
Dept: URBAN - METROPOLITAN AREA CLINIC 29 | Facility: CLINIC | Age: 68
Setting detail: OPHTHALMOLOGY
End: 2023-07-27
Payer: MEDICARE

## 2023-07-27 ENCOUNTER — RX ONLY (RX ONLY)
Age: 68
End: 2023-07-27

## 2023-07-27 DIAGNOSIS — H27.112: ICD-10-CM

## 2023-07-27 PROCEDURE — 99024 POSTOP FOLLOW-UP VISIT: CPT | Performed by: OPHTHALMOLOGY

## 2023-07-27 ASSESSMENT — REFRACTION_AUTOREFRACTION
OD_SPHERE: -1.00
OD_CYLINDER: +0.50
OD_AXIS: 005
OS_CYLINDER: +1.75
OS_AXIS: 165
OS_SPHERE: -1.75

## 2023-07-27 ASSESSMENT — REFRACTION_MANIFEST
OS_SPHERE: -1.50
OS_SPHERE: -2.00
OD_CYLINDER: SPH
OD_SPHERE: PL
OS_AXIS: 155
OS_CYLINDER: +2.25
OD_CYLINDER: SPH
OS_AXIS: 155
OD_VA1: 20/20
OS_VA1: 20/25-
OD_CYLINDER: SPH
OS_CYLINDER: +1.25
OS_VA1: 20/20-1
OD_VA1: 20/30
OD_SPHERE: PL
OS_CYLINDER: +1.00
OS_AXIS: 155
OS_CYLINDER: +1.75
OS_VA1: 20/30
OS_SPHERE: -2.25
OS_SPHERE: -1.75
OS_SPHERE: -2.00
OS_VA1: 20/25
OS_VA1: 20/25+
OD_SPHERE: PL
OS_CYLINDER: +0.75
OS_AXIS: 165
OS_AXIS: 165
OD_SPHERE: -0.50

## 2023-07-27 ASSESSMENT — SPHEQUIV_DERIVED
OD_SPHEQUIV: -0.75
OS_SPHEQUIV: -1.125
OS_SPHEQUIV: -0.875
OS_SPHEQUIV: -1.125
OS_SPHEQUIV: -1.375
OS_SPHEQUIV: -0.875
OS_SPHEQUIV: -1.5

## 2023-07-27 ASSESSMENT — VISUAL ACUITY
OD_BCVA: 20/25-2
OS_BCVA: 20/30-2

## 2023-07-27 ASSESSMENT — CONFRONTATIONAL VISUAL FIELD TEST (CVF)
OS_FINDINGS: FULL
OD_FINDINGS: FULL

## 2023-07-27 ASSESSMENT — LID EXAM ASSESSMENTS
OD_BLEPHARITIS: RLL RUL 1+
OS_BLEPHARITIS: LLL LUL 1+

## 2023-07-27 ASSESSMENT — SUPERFICIAL PUNCTATE KERATITIS (SPK): OD_SPK: T

## 2023-07-27 ASSESSMENT — TONOMETRY: OS_IOP_MMHG: 16

## 2023-09-11 ENCOUNTER — OFFICE (OUTPATIENT)
Dept: URBAN - METROPOLITAN AREA CLINIC 29 | Facility: CLINIC | Age: 68
Setting detail: OPHTHALMOLOGY
End: 2023-09-11
Payer: MEDICARE

## 2023-09-11 DIAGNOSIS — H27.112: ICD-10-CM

## 2023-09-11 PROCEDURE — 99024 POSTOP FOLLOW-UP VISIT: CPT | Performed by: OPHTHALMOLOGY

## 2023-09-11 ASSESSMENT — VISUAL ACUITY
OD_BCVA: 20/25-1
OS_BCVA: 20/25

## 2023-09-11 ASSESSMENT — TONOMETRY: OS_IOP_MMHG: 11

## 2023-09-11 ASSESSMENT — LID EXAM ASSESSMENTS
OD_BLEPHARITIS: RLL RUL 1+
OS_BLEPHARITIS: LLL LUL 1+

## 2023-09-11 ASSESSMENT — CONFRONTATIONAL VISUAL FIELD TEST (CVF)
OD_FINDINGS: FULL
OS_FINDINGS: FULL

## 2023-09-11 ASSESSMENT — SUPERFICIAL PUNCTATE KERATITIS (SPK): OD_SPK: T

## 2023-12-20 ASSESSMENT — REFRACTION_MANIFEST
OS_SPHERE: -2.00
OS_SPHERE: -0.50
OS_ADD: +2.25
OS_AXIS: 145
OS_AXIS: 155
OS_VA1: 20/25
OD_SPHERE: PL
OD_CYLINDER: SPH
OD_VA1: 20/20
OD_CYLINDER: SPH
OS_VA1: 20/25-
OS_AXIS: 165
OS_CYLINDER: +2.25
OS_CYLINDER: +0.75
OS_CYLINDER: +1.75
OD_CYLINDER: SPH
OS_VA1: 20/30
OS_CYLINDER: +1.00
OD_SPHERE: -0.25
OD_SPHERE: PL
OS_CYLINDER: +1.00
OD_VA1: 20/30
OD_SPHERE: PL
OS_SPHERE: -2.25
OS_AXIS: 155
OS_SPHERE: -1.75
OD_SPHERE: -0.50
OS_SPHERE: -1.50
OS_VA1: 20/20-1
OS_VA1: 20/25+
OS_SPHERE: -2.00
OS_AXIS: 155
OD_ADD: +1.75
OD_CYLINDER: SPH
OS_AXIS: 165
OS_CYLINDER: +1.25

## 2023-12-20 ASSESSMENT — REFRACTION_AUTOREFRACTION
OS_AXIS: 150
OS_SPHERE: -0.25
OD_CYLINDER: +0.25
OD_SPHERE: -0.50
OS_CYLINDER: +1.00
OD_AXIS: 008

## 2023-12-20 ASSESSMENT — SPHEQUIV_DERIVED
OD_SPHEQUIV: -0.375
OS_SPHEQUIV: -1.125
OS_SPHEQUIV: -1.125
OS_SPHEQUIV: 0
OS_SPHEQUIV: -1.5
OS_SPHEQUIV: -1.375
OS_SPHEQUIV: -0.875
OS_SPHEQUIV: 0.25

## 2024-11-12 NOTE — ASU PREOP CHECKLIST - SITE MARKED BY ANESTHESIOLOGIST
Additional Notes: Verbal permission over the phone from patients mother given to see patient in the presence of the sister Detail Level: Simple Render Risk Assessment In Note?: no n/a

## 2025-04-16 NOTE — ASU PATIENT PROFILE, ADULT - HEALTHCARE INFORMATION NEEDED, PROFILE
Admission Reconciliation is Completed  Discharge Reconciliation is Not Complete Admission Reconciliation is Completed  Discharge Reconciliation is Completed denies

## (undated) DEVICE — PACK VITRECTOMY

## (undated) DEVICE — SUT VICRYL 6-0 18" S-29 DA

## (undated) DEVICE — CONSTELLATION FRAGMENTATION PAK

## (undated) DEVICE — KNIFE MVR 20G

## (undated) DEVICE — BLADE 15 DEGREE

## (undated) DEVICE — LIGHT SHIELD CORNEAL

## (undated) DEVICE — DRAPE STERI-DRAPE INCISE 13X13"

## (undated) DEVICE — GLV 6.5 PROTEXIS (WHITE)

## (undated) DEVICE — SOL IRR BAL SALT + 500ML

## (undated) DEVICE — SUT PLAIN GUT 6-0 18" TG140-8

## (undated) DEVICE — WARMING BLANKET LOWER ADULT

## (undated) DEVICE — CANNULA ALCON PROVISC 27G THREADED HUB

## (undated) DEVICE — CONSTELLATION TOTAL PLUS PAK 23G

## (undated) DEVICE — NDL HYPO SAFE 22G X 1.5" (BLACK)

## (undated) DEVICE — CANNULA MEDONE FLEXTIP 25G

## (undated) DEVICE — SUT VICRYL 8-0 12" TG140-8 DA

## (undated) DEVICE — SUT NYLON 10-0 12" CU-5

## (undated) DEVICE — DRSG MASTISOL

## (undated) DEVICE — VENODYNE/SCD SLEEVE CALF MEDIUM